# Patient Record
Sex: FEMALE | Race: WHITE | URBAN - METROPOLITAN AREA
[De-identification: names, ages, dates, MRNs, and addresses within clinical notes are randomized per-mention and may not be internally consistent; named-entity substitution may affect disease eponyms.]

---

## 2022-01-17 NOTE — PATIENT INSTRUCTIONS
Increase Levothyroxine to 112 mcg daily     Continue current bHRT - 90 day refill authorized     Integrative/Functional Medicine Testing:    Lab testing in next 1-2 weeks - Lab orders faxed to [facility]     Dr Danica Smith OB/GYNE     Supplement [facility]) are not liable for the patients use of the Products. [facility] makes no representations or warranties of any kind, expressed or implied, as to the Products, including, but not limited to its efficacy, benefits or outcomes.   Patient agrees cooled green tea  • 2 cups spinach or kale  • 1 cup frozen pineapple chunks  • ? cup cucumber, peeled and cut into large chunks)  • ½ cup frozen swathi chunks  • ½ of a medium banana, peeled  • ½” fresh emely – peeled and cut from stalk (about ½ tsp)  • ¼ PCFeed.ca. aspx                    Lunch   LENTIL VEGETABLE SOUP  Ingredients   1 pound Western Yuliet green lentils, dry   4 cups chopped yellow onio the onions until they have a slight zander. 2. Remove from the grill to a cutting board and let cool slightly. Once cool, roughly chop and add them to a serving bowl along with the avocado. 3.  In a small bowl, whisk together the red wine vinegar, Dij Remove chicken from the bag and arrange pieces on a large, greased baking dish. 4. Roast until the chicken is just cooked through, about 1 hour. If your chicken browns too quickly, cover it with foil for the remaining cooking time.     5. Transfer the c emely  3 cups cooked black rice (from 1 cup uncooked)  3 tablespoons Liquid Aminos  2 teaspoons toasted sesame oil  1 teaspoon sriracha  2 eggs, beaten  1 tablespoon Organic Shelled Hemp Seed    Directions  1.  In a large wok or nonstick skillet heat the c powdered coconut sugar for Paleo or powdered sweetener)  1 cup + 2 Tbsp almond butter (or any nut butter)  2 Tbsp coconut oil  4 1/2 oz dark chocolate? Directions  1. In a large bowl, combine the almond flour, coconut, and sweetener.      2. Over medium

## 2022-01-17 NOTE — PROGRESS NOTES
Piotr Lutz is a 49year old female. Patient presents with:  New Patient: previous patient        HPI:   48 yo female present on bHRT. States on days without HRT. Notes no breast tenderness, mood dysfunction, vaginal bleeding or discharge.       F/ Nasal Solution USE 1 TO 2 SPRAYS IN EACH NOSTRIL 1 TO 2 TIMES PER DAY     • SYNTHROID 100 MCG Oral Tab      • Omega-3 Fatty Acids (OMEGA 3 OR) Take by mouth. • Multiple Vitamin (MULTI-VITAMIN DAILY) Oral Tab Take by mouth.      • Ergocalciferol (VITAMIN Judgment normal.         ASSESSMENT AND PLAN:     No results found for any previous visit. No results found. 1. Arthralgia, unspecified joint  - C-RP/HIGH SENSITIVITY; Future    2.  Christine-menopause      Endocrine: Perimenopausal symptoms with history encounter.       Patient Instructions     Increase Levothyroxine to 112 mcg daily     Continue current bHRT - 90 day refill authorized     Integrative/Functional Medicine Testing:    Lab testing in next 1-2 weeks - Lab orders faxed to [facility] its affiliates and its [facility] are not liable for the patients use of the Products.  [address] [place] [address] makes no representations or warranties of any kind, expressed or implied, as to the Products, including, but not limited to its efficacy, be SMOOTHIE  Ingredients  • 1 cup brewed and cooled green tea  • 2 cups spinach or kale  • 1 cup frozen pineapple chunks  • ? cup cucumber, peeled and cut into large chunks)  • ½ cup frozen swathi chunks  • ½ of a medium banana, peeled  • ½” fresh emely – pee PCFeed.ca. aspx                    Lunch   LENTIL VEGETABLE SOUP  Ingredients   1 pound Western Yuliet green lentils, dry   4 cups chopped yellow onio the onions until they have a slight zander. 2. Remove from the grill to a cutting board and let cool slightly. Once cool, roughly chop and add them to a serving bowl along with the avocado. 3.  In a small bowl, whisk together the red wine vinegar, Dij Remove chicken from the bag and arrange pieces on a large, greased baking dish. 4. Roast until the chicken is just cooked through, about 1 hour. If your chicken browns too quickly, cover it with foil for the remaining cooking time.     5. Transfer the c emely  3 cups cooked black rice (from 1 cup uncooked)  3 tablespoons Liquid Aminos  2 teaspoons toasted sesame oil  1 teaspoon sriracha  2 eggs, beaten  1 tablespoon Organic Shelled Hemp Seed    Directions  1.  In a large wok or nonstick skillet heat the c powdered coconut sugar for Paleo or powdered sweetener)  1 cup + 2 Tbsp almond butter (or any nut butter)  2 Tbsp coconut oil  4 1/2 oz dark chocolate? Directions  1. In a large bowl, combine the almond flour, coconut, and sweetener.      2. Over medium

## 2022-02-15 NOTE — TELEPHONE ENCOUNTER
From: Zuly Castaneda  To: Sonja Birmingham,   Sent: 2/15/2022 7:03 AM CST  Subject: labs are in    [facility], I was wondering if you could review my labs. I can see the results and was hopeful they came on your end as well. Do you have any suggestions for the Thyroid? I was on 100mg and increased to 112mg. Also can you order a new Azelastine HCI. Can that be processed through my [facility] or does it need to come from [place] [address] in [facility]? It worked really well. Thanks, have a great day.   Alirio Rodrigues

## 2022-02-16 NOTE — TELEPHONE ENCOUNTER
Please advise patient that we will increase her levothyroxine to 125 mcg based on recent test in care everywhere

## 2022-03-08 NOTE — TELEPHONE ENCOUNTER
Message routed to  [address] [place] [address] for review and recommendations - questions 2 and 3.

## 2022-03-14 NOTE — TELEPHONE ENCOUNTER
Patient calling again, requests thyroid lab orders. Patient would like to complete her labs this evening. Send orders to: [facility] at fax # [phone] and call patient when sent. Patient states she feels the dose for her Levothyroxine is off and would like to have labs completed. Purse String (Simple) Text: Given the location of the defect and the characteristics of the surrounding skin a purse string closure was deemed most appropriate.  Undermining was performed circumfirentially around the surgical defect.  A purse string suture was then placed and tightened.

## 2022-03-18 NOTE — TELEPHONE ENCOUNTER
Please advise patient:    Labs have been reviewed recommend increasing dose of levothyroxine 150 mcg. A week prescription has been sent for her at this time. I have placed labs to repeat in 6 weeks to ensure this medication is adequate. Please have her schedule follow-up in 7 weeks to discuss the results and figure out why this change may have occurred.

## 2022-03-21 NOTE — TELEPHONE ENCOUNTER
Please advise patient:     Her TSH was elevated which means her brain is telling her thyroid to make more hormone because there may not be enough. We can set up a sooner appointment to discuss the risk and benefits of converting to a combo T3/T4.

## 2022-03-21 NOTE — TELEPHONE ENCOUNTER
Pt informed. Pt states she just received Hormone labs and will send them through [address] [place] [address] [address]. Transferred to  to schedule for sooner appointment.

## 2022-04-19 NOTE — TELEPHONE ENCOUNTER
From: Mauricio Yates  To: Gilbertolauri Tapia, DO  Sent: 4/19/2022 6:31 AM CDT  Subject: order labs please    Hello,  I have an appointment on next week on April 29. I have been on the new dose 150mcg of Synthroid for a month. Can you please order new labs so I can have the blood draw in enough time to get results back to you? Please fax the order to [phone]. Thanks.   Nae Rich [phone]

## 2022-04-28 NOTE — TELEPHONE ENCOUNTER
Received Thyroid labs from [address] [place] [address] . Placed on providers desk for review.

## 2022-05-09 NOTE — TELEPHONE ENCOUNTER
Received T3 result from [address] [place] [address] other test results. Placed on providers desk for review.

## 2022-05-16 NOTE — TELEPHONE ENCOUNTER
From: Piotr Lutz  To: Pavel Marquez DO  Sent: 5/13/2022 10:47 PM CDT  Subject: T3 Reverse    Here is my T3 rev. It just came in. It seems a little high. We changed my estrogen cream and progestrogen as well, will that help bring this number down?    Thanks  Ronaldo Harkins

## 2022-07-28 ENCOUNTER — TELEPHONE (OUTPATIENT)
Dept: INTEGRATIVE MEDICINE | Facility: CLINIC | Age: 52
End: 2022-07-28

## 2022-07-28 NOTE — TELEPHONE ENCOUNTER
From: Ariela Mirza  To: Jeremi Vyas DO  Sent: 7/28/2022 10:16 AM CDT  Subject: Progesterone time release    Can you please re-order my progesteron capsules from [facility]. They are time release. Can you order a year's worth please? I know they faxed over a request on July 26.    Thanks  Marlys Yu

## 2022-12-27 NOTE — TELEPHONE ENCOUNTER
RN called and spoke to pt. Pt states she went to her chiropractor today for assessment of her leg pain. Pt c/o right leg hip pain that started a few weeks ago. Pt states she recently began different workouts and believes that this may be the cause. Pt would like her chiropractor to manage this injury- the chiropractor told the patient that her injury is an inguinal tendon injury. Pt denies sob, cp, fever, dizziness, redness or warmth, swelling, or other urgent symptoms.      Pt requested we cancel her upcoming appt with DANISHA Hollins.

## 2023-02-22 NOTE — TELEPHONE ENCOUNTER
From: Kira Cortés  To: Horacio Pritchett DO  Sent: 2/19/2023 3:28 PM CST  Subject: labs     Hello  I had a health screening for my insurance program. Attached are my results. My iron is 181. Thanks. Please attach to my file. Thanks.  Martir Mccain

## 2023-06-19 NOTE — TELEPHONE ENCOUNTER
S/w Komal Chahal who stated that all her past labs have been displayed in her My Chart that were drawn at [facility] but now are available. Lab results ordered by Dr. Shannan Mckenzie copied and sent to the pt.

## 2023-06-19 NOTE — TELEPHONE ENCOUNTER
From: Dieter Vera  To: Chioma Johnson PA-C  Sent: 6/19/2023 3:49 PM CDT  Subject: Lab results no in my portal    Hello,  I am looking for my previous lab results, but the only ones that are viewable are from June 13 this year. Everything else shows as a scan and there is no data available. Can you tell me were to access my lab history? Thanks.   Erasto Goss

## 2023-07-20 PROBLEM — N95.1 PERIMENOPAUSAL: Status: ACTIVE | Noted: 2023-07-20

## 2023-07-20 PROBLEM — E89.0 POSTABLATIVE HYPOTHYROIDISM: Status: ACTIVE | Noted: 2023-07-20

## 2023-07-20 PROBLEM — N92.6 IRREGULAR PERIODS: Status: ACTIVE | Noted: 2023-07-20

## 2023-07-20 PROBLEM — E03.9 HYPOTHYROIDISM: Status: ACTIVE | Noted: 2023-07-20

## 2023-07-24 NOTE — TELEPHONE ENCOUNTER
S/w Catherine Willard informed per  to stop 137 mcg of levothyroxine and take the 125 mcg of levothyroxine immediately. Pt voiced understanding.

## 2023-07-24 NOTE — TELEPHONE ENCOUNTER
Please advise:    Stop the 137 mcg dose and start the 125 mcg dose of levothyroxine. It appears she requires less thyroid replacement at this time.

## 2023-07-24 NOTE — TELEPHONE ENCOUNTER
From: Diann Coley  To: Curt Howell DO  Sent: 7/22/2023 10:03 PM CDT  Subject: levothyroxine    Hello,  The paper work from my visit is a little confusing regarding my Rx. I was on levothyroxine 137 and the paperwork states \"Another medication with the same name has been \"added\". For the new 125 mcg it states\"You are already taking a medication with the same name, and this prescription was added. \" Am I supposed to take BOTH 137 mcg pill and a 125 mcg pill? In addition, I had tried the 125 mcg dosage last year. The bottle was filled 2/17/22. So we may have some blood work when I was on 125 mcg. Thanks so much. PLease let me know if I need to take 2 pills. Thank you.   Ruth Arita

## 2023-07-24 NOTE — TELEPHONE ENCOUNTER
Pt is inquiring if she is to stop the prior dose of levothyroxine 137 mcg and start the new dose of 125 mcg since her AVS has bother prescriptions listed. Please advise.

## 2023-08-15 NOTE — TELEPHONE ENCOUNTER
Patient is requesting a refill of BHRT. Orders pended- per your last note:     Medication change: Upon refill of HRT may increase testosterone to 1.0 mg per total dose. Please review and adjust as appropriate.

## 2023-09-19 NOTE — PROGRESS NOTES
Jaylan Alvarez is a 47year old female. Patient presents with: Follow - Up: Medication      HPI:     52-year-old female presents for follow-up. Reports intermittent palpitations and poor sleep. Notes some mild heat intolerance. Taking levothyroxine as directed. Symptoms persist despite lowering levothyroxine to 125 mcg. Last TSH was suppressed. Reports some increased stress in life but otherwise no changes in medication or supplements. She reports no hot flashes and continues to take her HRT as directed. REVIEW OF SYSTEMS:   Review of Systems   Constitutional:  Positive for malaise/fatigue. Cardiovascular:  Positive for palpitations. Psychiatric/Behavioral:  The patient has insomnia. All other systems reviewed and are negative. FAMILY HISTORY:      Family History   Problem Relation Age of Onset    Heart Disorder Father     Cancer Maternal Grandmother        MEDICAL HISTORY:     Past Medical History:   Diagnosis Date    Allergic rhinitis     Arthritis september 2021    thumb and hands    Hyperthyroidism        CURRENT MEDICATIONS:     Current Outpatient Medications   Medication Sig Dispense Refill    levothyroxine 100 MCG Oral Tab Take 1 tablet (100 mcg total) by mouth before breakfast. 90 tablet 0    CUSTOM MEDICATION E2 2.25 mg/ E3 2.25 mg/Testosterone 4.0 mg/ml   Apply 3.97 ml or 1 click dialy  Disp qS for 90 days 90 each 2    CUSTOM MEDICATION Progesterone 125 mg E4M  Take 1 capsule nightly 90 each 2    CUSTOM MEDICATION GHK-Cu + BPC-157 2/2mg/g Organic Bio-Cream  1-2 pumps daily for 30 days 30 each 0    Testosterone Does not apply Powder       levothyroxine 125 MCG Oral Tab Take 1 tablet (125 mcg total) by mouth before breakfast. 90 tablet 0    Omega-3 Fatty Acids (OMEGA 3 OR) Take by mouth. Multiple Vitamin (MULTI-VITAMIN DAILY) Oral Tab Take by mouth. Ergocalciferol (VITAMIN D OR) Take by mouth.          SOCIAL HISTORY:   Social History    Socioeconomic History Marital status:       Spouse name: Not on file      Number of children: Not on file      Years of education: Not on file      Highest education level: Not on file    Occupational History      Not on file    Tobacco Use      Smoking status: Never      Smokeless tobacco: Never    Vaping Use      Vaping Use: Never used    Substance and Sexual Activity      Alcohol use: Not Currently        Alcohol/week: 0.0 standard drinks of alcohol      Drug use: Never      Sexual activity: Not on file    Other Topics      Concerns:        Caffeine Concern: No        Exercise: Yes          daily 30 min at least, walking 3-4 miles 2 times per week. Seat Belt: Yes        Special Diet: No        Stress Concern: No        Weight Concern: No    Social History Narrative      Not on file    Social Determinants of Health  Financial Resource Strain: Not on file  Food Insecurity: Not on file  Transportation Needs: Not on file  Physical Activity: Not on file  Stress: Not on file  Social Connections: Not on file  Housing Stability: Not on file    SURGICAL HISTORY:     Past Surgical History:   Procedure Laterality Date    Appendectomy      Colonoscopy  Aug  2 2021       PHYSICAL EXAM:   There were no vitals filed for this visit. Physical Exam    ASSESSMENT AND PLAN:     No visits with results within 6 Month(s) from this visit. Latest known visit with results is:   Patient Message on 02/19/2023   Component Date Value Ref Range Status    FERRITIN 06/13/2023 43  16 - 232 ng/mL Final    IRON, TOTAL 06/13/2023 124  45 - 160 mcg/dL Final    IRON BINDING CAPACITY 06/13/2023 280  250 - 450 mcg/dL (calc) Final    % SATURATION 06/13/2023 44  16 - 45 % (calc) Final      No results found.     1. Hypothyroidism, unspecified type  - CBC With Differential With Platelet  - Comp Metabolic Panel (14)  - Assay, Thyroid Stim Hormone  - Free T4, (Free Thyroxine)  - Reverse T3, Serum  - Free T3 (Triiodothryronine)      Endocrine: History of hypothyroidism. Symptoms consistent with over thyroid replacement. Past labs reviewed demonstrating suppressed TSH over last 2 thyroid panels. Symptoms persist despite reduction of thyroid medication. We will further reduce at this time and reassess symptomatology. Reduce levothyroxine to 100 mcg daily  Perform thyroid panel tomorrow   Continue other supplements and nutrient support at this time. This visit was conducted using Telemedicine with live, interactive video and audio. The patient understands the risks and benefits of Telemedicine and that a Telemedicine visit limits the ability to perform a thorough physical examination which may affect objective findings related to specific symptoms and conditions which can, in turn, affect treatment. The patient was located in the state of [place] at the time of the encounter. Time spent with patient: 23 minutes in direct communication explaining the rationale for treatment, creating unique care plan in EPIC, reviewing SE and overall treatment plan. Over 50% of this time was in education, counseling and coordination of care. Given further recommendations as below    Orders Placed This Visit:  Orders Placed This Encounter      CBC With Differential With Platelet      Comp Metabolic Panel (14)      Assay, Thyroid Stim Hormone      Free T4, (Free Thyroxine)      Reverse T3, Serum      Free T3 (Triiodothryronine)    No orders of the defined types were placed in this encounter. There are no Patient Instructions on file for this visit. No follow-ups on file. Patient affirmed understanding of plan and all questions were answered.      Teresa Mendez [address] [place] [address]

## 2023-10-03 NOTE — TELEPHONE ENCOUNTER
Discussed symptoms with patient. Started after increasing testosterone dose in current hormone replacement therapy. Patient is scheduled on 10/27 for pelvic ultrasound at this time. Discussed with OB/GYN Dr. Colton Hinojosa. Will initiate use of Provera 10 mg and stop progesterone until ultrasound. He she will then follow-up with Dr. Colton Hinojosa to discuss findings of evaluation.

## 2023-10-03 NOTE — TELEPHONE ENCOUNTER
Reason for Disposition   Age > 40 years with irregular or excessive bleeding    Protocols used: Vaginal Bleeding - [id number]      RN s/w patient. Patient states that she started to have vaginal spotting (brown color) today. Patient says that she is on BHRT and recently  her dosing has changed. Pt denies heavy bleeding or passing clots at this time. Patient is having cramping as well. Patient denies severe abdominal pain at this time. Patient says she has not had a period in 10 years and that she is in menopause. Patient scheduled for assessment  and evaluation with CHARLIE Andrews today. ED precautions discussed with patient.

## 2023-10-09 NOTE — TELEPHONE ENCOUNTER
From: Radha Singh  To: Reyes Meek [facility]  Sent: 10/8/2023 3:19 PM CDT  Subject: RX for Pro Omega    Hello,  In order to be reimbursed by my flex spending they are requesting an RX for the [employer] Naturals Pro Omega 2000 D3 1250mg 120 soft gels. In addition can you please write one for the [place]. Can you please write a script FOR A YEARS worth and email me or fax it to [phone]. Thank you so much.  Lynda Hill

## 2023-10-26 ENCOUNTER — TELEPHONE (OUTPATIENT)
Dept: INTEGRATIVE MEDICINE | Facility: CLINIC | Age: 53
End: 2023-10-26

## 2023-10-26 NOTE — TELEPHONE ENCOUNTER
Please fax ultrasound order to [facility].     Completed by [facility]

## 2023-10-27 ENCOUNTER — MED REC SCAN ONLY (OUTPATIENT)
Dept: INTEGRATIVE MEDICINE | Facility: CLINIC | Age: 53
End: 2023-10-27

## 2023-10-30 ENCOUNTER — TELEMEDICINE (OUTPATIENT)
Dept: INTEGRATIVE MEDICINE | Facility: CLINIC | Age: 53
End: 2023-10-30

## 2023-10-30 DIAGNOSIS — N95.0 POST-MENOPAUSE BLEEDING: ICD-10-CM

## 2023-10-30 DIAGNOSIS — J01.90 SUBACUTE SINUSITIS, UNSPECIFIED LOCATION: Primary | ICD-10-CM

## 2023-10-30 RX ORDER — DOXYCYCLINE HYCLATE 100 MG/1
100 CAPSULE ORAL 2 TIMES DAILY
Qty: 14 CAPSULE | Refills: 0 | Status: SHIPPED | OUTPATIENT
Start: 2023-10-30 | End: 2023-11-06

## 2023-10-30 NOTE — PROGRESS NOTES
Ben Lau is a 47year old female. Patient presents with: Follow - Up: Ultrasound       HPI:   15-year-old female presents for follow-up. She reports no additional incidence of postmenopausal bleeding at this time. She took progesterone as directed and recently transition back to micronized progesterone. Had ultrasound reported on 10/27.  3 mm endometrial stripe with possible ovoid polypoid thickening measuring 13 x 8 mm. Has follow-up with OB/GYN tomorrow. Reports onset of breast tenderness, mild lower abdominal cramping with use of new medroxyprogesterone acetate. Reports recent URI approximately 2 to 3 weeks ago. At onset of symptoms had malaise, cough, runny nose. Notes no fever or chills. Today reports having continued sinus congestion with mild sinus pain. Notes no shortness of breath, chest pain or new fevers. REVIEW OF SYSTEMS:   Review of Systems   Constitutional:  Negative for fever, malaise/fatigue and weight loss. HENT:  Positive for congestion and sinus pain. Negative for hearing loss and tinnitus. Eyes:  Negative for blurred vision, double vision and pain. Respiratory:  Negative for cough, shortness of breath and wheezing. Cardiovascular:  Negative for chest pain, palpitations and leg swelling. Gastrointestinal:  Negative for abdominal pain, constipation, diarrhea, heartburn, nausea and vomiting. Genitourinary:  Negative for dysuria, frequency, hematuria and urgency. Musculoskeletal:  Negative for myalgias. Neurological:  Negative for dizziness, tingling, speech change, loss of consciousness, weakness and headaches. Endo/Heme/Allergies:  Negative for environmental allergies. Psychiatric/Behavioral:  Negative for depression and suicidal ideas. The patient is not nervous/anxious and does not have insomnia.         FAMILY HISTORY:      Family History   Problem Relation Age of Onset    Heart Disorder Father     Cancer Maternal Grandmother MEDICAL HISTORY:     Past Medical History:   Diagnosis Date    Allergic rhinitis     Arthritis september 2021    thumb and hands    Hyperthyroidism        CURRENT MEDICATIONS:     Current Outpatient Medications   Medication Sig Dispense Refill    doxycycline 100 MG Oral Cap Take 1 capsule (100 mg total) by mouth 2 (two) times daily for 7 days. 14 capsule 0    CUSTOM MEDICATION E2 2.25 mg/ E3 2.25 mg/Testosterone 2.0 mg/ml   Apply 7.28 ml or 1 click dialy  Disp qS for 90 days 90 each 2    medroxyPROGESTERone Acetate 10 MG Oral Tab Take 1 tablet (10 mg total) by mouth daily. For 10 days 30 tablet 0    levothyroxine 100 MCG Oral Tab Take 1 tablet (100 mcg total) by mouth before breakfast. 90 tablet 0    CUSTOM MEDICATION Progesterone 125 mg E4M  Take 1 capsule nightly 90 each 2    CUSTOM MEDICATION GHK-Cu + BPC-157 2/2mg/g Organic Bio-Cream  1-2 pumps daily for 30 days 30 each 0    Testosterone Does not apply Powder       levothyroxine 125 MCG Oral Tab Take 1 tablet (125 mcg total) by mouth before breakfast. 90 tablet 0    Omega-3 Fatty Acids (OMEGA 3 OR) Take by mouth. Multiple Vitamin (MULTI-VITAMIN DAILY) Oral Tab Take by mouth. Ergocalciferol (VITAMIN D OR) Take by mouth. SOCIAL HISTORY:   Social History    Socioeconomic History      Marital status:       Spouse name: Not on file      Number of children: Not on file      Years of education: Not on file      Highest education level: Not on file    Occupational History      Not on file    Tobacco Use      Smoking status: Never      Smokeless tobacco: Never    Vaping Use      Vaping Use: Never used    Substance and Sexual Activity      Alcohol use: Not Currently        Alcohol/week: 0.0 standard drinks of alcohol      Drug use: Never      Sexual activity: Not on file    Other Topics      Concerns:        Caffeine Concern: No        Exercise: Yes          daily 30 min at least, walking 3-4 miles 2 times per week.         Seat Belt: Yes Special Diet: No        Stress Concern: No        Weight Concern: No    Social History Narrative      Not on file    Social Determinants of Health  Financial Resource Strain: Not on file  Food Insecurity: Not on file  Transportation Needs: Not on file  Physical Activity: Not on file  Stress: Not on file  Social Connections: Not on file  Housing Stability: Not on file    SURGICAL HISTORY:     Past Surgical History:   Procedure Laterality Date    Appendectomy      Colonoscopy  Aug  2 2021       PHYSICAL EXAM:   There were no vitals filed for this visit. Physical Exam    ASSESSMENT AND PLAN:     No visits with results within 6 Month(s) from this visit. Latest known visit with results is:   Patient Message on 02/19/2023   Component Date Value Ref Range Status    FERRITIN 06/13/2023 43  16 - 232 ng/mL Final    IRON, TOTAL 06/13/2023 124  45 - 160 mcg/dL Final    IRON BINDING CAPACITY 06/13/2023 280  250 - 450 mcg/dL (calc) Final    % SATURATION 06/13/2023 44  16 - 45 % (calc) Final      No results found. 1. Subacute sinusitis, unspecified location    2. Post-menopause bleeding      : Recent history of postmenopausal bleeding while on HRT. Endometrial stripe 3 mm with possible polyp measuring 1.3 x 0.8 cm. Left follow-up with OB/GYN tomorrow. Significant symptomatology may be related to use of medroxyprogesterone. Discussed need to reevaluate use of hormones given it is been greater than 5 years from initiation. Reduce Biest to 0.125 mL daily  Restart progesterone 125 mg ER  Follow-up with OB/GYN tomorrow for further care plan    Sinusitis: Recent history of URI with possible developing sinusitis. Discussed wait-and-see antibiotic. Will provide doxycycline with instructions to hold unless developing fever or worsening sinus pain or pressure. This visit was conducted using Telemedicine with live, interactive video and audio.    The patient understands the risks and benefits of Telemedicine and that a Telemedicine visit limits the ability to perform a thorough physical examination which may affect objective findings related to specific symptoms and conditions which can, in turn, affect treatment. The patient was located in the state of [place] at the time of the encounter. Time spent with patient: 32 minutes in direct communication explaining the rationale for treatment, creating unique care plan in EPIC, reviewing SE and overall treatment plan. Over 50% of this time was in education, counseling and coordination of care. Given further recommendations as below    Orders Placed This Visit:  No orders of the defined types were placed in this encounter. No orders of the defined types were placed in this encounter. There are no Patient Instructions on file for this visit. Return in about 3 months (around 1/30/2024). Patient affirmed understanding of plan and all questions were answered.      Augustine Calvo [address] [place] [address]

## 2023-11-03 ENCOUNTER — NURSE TRIAGE (OUTPATIENT)
Dept: INTEGRATIVE MEDICINE | Facility: CLINIC | Age: 53
End: 2023-11-03

## 2023-11-03 NOTE — TELEPHONE ENCOUNTER
RN LVM for patient to call office- triage uterine bleeding. RN s/w patient. Patient states that she started to have bleeding yesterday. Patient has hx of uterine polyp. Patient is scheduled to have her uterine polyp removed  on 11/27/23. Patient is concerned that this bleeding may have to do with polyp or the BHRT.   OBGYN is not in office today for patient to call. OBGYN is Dr. Sherry Pelayo with [facility]. Patient dispo to ED/ICC for evaluation. Patient refused disposition, states Jody Powell is not going to do that\"     Message forwarded to Dr. Jackie Edwards for review. Reason for Disposition   MODERATE vaginal bleeding (i.e., soaking pad or tampon per hour and present > 6 hours; 1 menstrual cup every 6 hours)    Answer Assessment - Initial Assessment Questions  1. AMOUNT: \"Describe the bleeding that you are having. \"     - SPOTTING: spotting, or pinkish / brownish mucous discharge; does not fill panty liner or pad     - MILD:  less than 1 pad / hour; less than patient's usual menstrual bleeding    - MODERATE: 1-2 pads / hour; 1 menstrual cup every 6 hours; small-medium blood clots (e.g., pea, grape, small coin)    - SEVERE: soaking 2 or more pads/hour for 2 or more hours; 1 menstrual cup every 2 hours; bleeding not contained by pads or continuous red blood from vagina; large blood clots (e.g., golf ball, large coin)       Patient describes bleeding as moderate. 2. ONSET: \"When did the bleeding begin? \" \"Is it continuing now? \"      Yesterday   3. MENSTRUAL PERIOD: \"When was the last normal menstrual period? \" \"How is this different than your period? \"      8 years ago    5. ABDOMINAL PAIN: \"Do you have any pain? \" \"How bad is the pain? \"  (e.g., Scale 1-10; mild, moderate, or severe)    - MILD (1-3): doesn't interfere with normal activities, abdomen soft and not tender to touch     - MODERATE (4-7): interferes with normal activities or awakens from sleep, abdomen tender to touch     - SEVERE (8-10): excruciating pain, doubled over, unable to do any normal activities       Mild- 2/10      8. HORMONES: Moncho Ayala you taking any hormone medications, prescription or OTC? \" (e.g., birth control pills, estrogen)      yes  9. BLOOD THINNERS: \"Do you take any blood thinners? \" (e.g., Coumadin/warfarin, Pradaxa/dabigatran, aspirin)      None    11. HEMODYNAMIC STATUS: \"Are you weak or feeling lightheaded? \" If Yes, ask: \"Can you stand and walk normally? \"         None  12. OTHER SYMPTOMS: \"What other symptoms are you having with the bleeding? \" (e.g., passed tissue, vaginal discharge, fever, menstrual-type cramps)        Breast tenderness    Protocols used: Vaginal Bleeding - [id number]

## 2023-11-06 ENCOUNTER — TELEPHONE (OUTPATIENT)
Dept: FAMILY MEDICINE CLINIC | Facility: CLINIC | Age: 53
End: 2023-11-06

## 2023-11-06 ENCOUNTER — NURSE TRIAGE (OUTPATIENT)
Dept: FAMILY MEDICINE CLINIC | Facility: CLINIC | Age: 53
End: 2023-11-06

## 2023-11-06 NOTE — TELEPHONE ENCOUNTER
S/w Saad Blanchard stated has uterine polyp is having \" strong period\". Post menopausal for 8 years. Pt is inquiring from Dr. Nancy Quezada about hormone therapy. Pt does have a call out to GYN for direction in care. Separate message sent to Dr. Nancy Quezada for further discussion of symptoms. Reason for Disposition   Age > 40 years with irregular or excessive bleeding    Answer Assessment - Initial Assessment Questions  1. AMOUNT: \"Describe the bleeding that you are having. \"     - SPOTTING: spotting, or pinkish / brownish mucous discharge; does not fill panty liner or pad     - MILD:  less than 1 pad / hour; less than patient's usual menstrual bleeding    - MODERATE: 1-2 pads / hour; 1 menstrual cup every 6 hours; small-medium blood clots (e.g., pea, grape, small coin)    - SEVERE: soaking 2 or more pads/hour for 2 or more hours; 1 menstrual cup every 2 hours; bleeding not contained by pads or continuous red blood from vagina; large blood clots (e.g., golf ball, large coin)      Pt is changing panty liner 1 time a hour. Pt is changing regular tampon every hour with clots (smaller pinky size) but then stated only changing tampon 2-3 times a day. 2. ONSET: \"When did the bleeding begin? \" \"Is it continuing now? \"      11/2/23  3. MENSTRUAL PERIOD: \"When was the last normal menstrual period? \" \"How is this different than your period? \"      N/A  4. REGULARITY: \"How regular are your periods? \"      Post menopausal.  5. ABDOMINAL PAIN: \"Do you have any pain? \" \"How bad is the pain? \"  (e.g., Scale 1-10; mild, moderate, or severe)    - MILD (1-3): doesn't interfere with normal activities, abdomen soft and not tender to touch     - MODERATE (4-7): interferes with normal activities or awakens from sleep, abdomen tender to touch     - SEVERE (8-10): excruciating pain, doubled over, unable to do any normal activities       3/10 lower abdominal cramping  6. PREGNANCY: \"Could you be pregnant? \" Channie Groton you sexually active? \" \"Did you recently give birth? \"      N/A  7. BREASTFEEDING: Peter Barnard you breastfeeding? \"      N/A  8. HORMONES: \"Are you taking any hormone medications, prescription or OTC? \" (e.g., birth control pills, estrogen)      Vaginal testosterone cream but still taking progesterone orally. 9. BLOOD THINNERS: \"Do you take any blood thinners? \" (e.g., Coumadin/warfarin, Pradaxa/dabigatran, aspirin)      Denied  10. CAUSE: \"What do you think is causing the bleeding? \" (e.g., recent gyn surgery, recent gyn procedure; known bleeding disorder, cervical cancer, polycystic ovarian disease, fibroids)          Unsure   11. HEMODYNAMIC STATUS: \"Are you weak or feeling lightheaded? \" If Yes, ask: \"Can you stand and walk normally? \"         Denied  12. OTHER SYMPTOMS: \"What other symptoms are you having with the bleeding? \" (e.g., passed tissue, vaginal discharge, fever, menstrual-type cramps)        Denied    Protocols used: Vaginal Bleeding - [id number]

## 2023-11-06 NOTE — TELEPHONE ENCOUNTER
Consult with Dr. Jhonatan Pate who is waiting for GYN to discuss symptoms with the pt and their plan  of care.

## 2023-11-06 NOTE — TELEPHONE ENCOUNTER
S/w Shae vaginal bleeding triaged (see triage note). Pt is inquiring is should continue her progesterone po? Pt is unable to take  E2 2.25 mg/ E3 2.25 mg/Testosterone 2.0 vaginally due to the bleeding. Pt stated is soaking panty liner q hour that is more bleeding than prior. Pt is still taking progesterone orally. Pt has concerns hormones causing bleeding. Please advise.

## 2023-11-17 ENCOUNTER — TELEPHONE (OUTPATIENT)
Dept: INTEGRATIVE MEDICINE | Facility: CLINIC | Age: 53
End: 2023-11-17

## 2023-12-10 ENCOUNTER — PATIENT MESSAGE (OUTPATIENT)
Dept: INTEGRATIVE MEDICINE | Facility: CLINIC | Age: 53
End: 2023-12-10

## 2023-12-11 NOTE — TELEPHONE ENCOUNTER
From: Ben Wagoner  To: Marleen Marc  Sent: 12/10/2023 11:21 AM CST  Subject: Copies of Medical Records    Hello,  I would like copies of all my medical records please. Please mail them to [phone] [facility]. [place], [address] [place] [address]. Thanks so much.   Guera Gurrola

## 2023-12-12 NOTE — PATIENT INSTRUCTIONS
Symptoms of thyroid over replacement are listed below. .     Feeling hot or shaky. Heart palpitations. Difficulty falling asleep. Excessive sweating  Anxiety  Mood swings  Hand tremors  Diarrhea  Muscle weakness, mainly in the thighs and shoulders.   Weight loss  Inability to sleep and/or focus  Abnormally increased heart rate, even at rest
detailed exam

## 2023-12-18 RX ORDER — LEVOTHYROXINE SODIUM 0.1 MG/1
100 TABLET ORAL
Qty: 90 TABLET | Refills: 0 | Status: SHIPPED | OUTPATIENT
Start: 2023-12-18

## 2023-12-18 NOTE — TELEPHONE ENCOUNTER
A refill request was received for:  Requested Prescriptions     Pending Prescriptions Disp Refills    levothyroxine 100 MCG Oral Tab 90 tablet 0     Sig: Take 1 tablet (100 mcg total) by mouth before breakfast.     Last refill date:  9/19/23   Qty: 90 and 0   Dx: hypothyroidism   Last office visit: 10/30/23

## 2024-01-26 ENCOUNTER — MED REC SCAN ONLY (OUTPATIENT)
Dept: INTEGRATIVE MEDICINE | Facility: CLINIC | Age: 54
End: 2024-01-26

## 2024-03-18 RX ORDER — LEVOTHYROXINE SODIUM 0.1 MG/1
100 TABLET ORAL
Qty: 90 TABLET | Refills: 3 | Status: SHIPPED | OUTPATIENT
Start: 2024-03-18

## (undated) NOTE — LETTER
Dr. Dipak Pitt, DO     [facility], [facility], [address] [facility]  [address] [facility] [place] [address] [facility]  [phone]       10/09/23    Checo Mendoza      Re: Checo Mendoza    To Whom It May Concern:  Checo Mendoza (: ) is a patient currently under our care at [facility]. This patient is currently being treated for the following medical conditions including:  Low libido (R68.82), Family History of Heart Disease (Z82.49). I certify that the products and/or services are medically necessary to treat the specific medical conditions described above and are not in any way for general health or for cosmetic purposes. The current treatment duration for the products or services below is 12 months unless otherwise specified. Products/Services:     [employer] Naturals Pro Omega 2000 D3 1250mg 120 soft gels - 2 caps daily   Svetlana-3 by Pure Encapsulations - 2 caps daily     Please contact our office with any additional questions. Sincerely,    Eleanor Flynn D.O.   Staff Physician

## (undated) NOTE — Clinical Note
Please ensure [facility]. Fax compounded hrt to [facility] in [place].  Please mail mammogram order